# Patient Record
Sex: FEMALE
[De-identification: names, ages, dates, MRNs, and addresses within clinical notes are randomized per-mention and may not be internally consistent; named-entity substitution may affect disease eponyms.]

---

## 2023-10-22 ENCOUNTER — RX ONLY (OUTPATIENT)
Age: 48
Setting detail: RX ONLY
End: 2023-10-22

## 2023-11-28 ENCOUNTER — APPOINTMENT (RX ONLY)
Dept: URBAN - METROPOLITAN AREA CLINIC 41 | Facility: CLINIC | Age: 48
Setting detail: DERMATOLOGY
End: 2023-11-28

## 2023-11-28 DIAGNOSIS — L30.9 DERMATITIS, UNSPECIFIED: ICD-10-CM | Status: INADEQUATELY CONTROLLED

## 2023-11-28 PROCEDURE — 99204 OFFICE O/P NEW MOD 45 MIN: CPT

## 2023-11-28 PROCEDURE — ? TREATMENT REGIMEN

## 2023-11-28 PROCEDURE — ? COUNSELING

## 2023-11-28 PROCEDURE — ? PRESCRIPTION

## 2023-11-28 RX ORDER — CALCIPOTRIENE AND BETAMETHASONE DIPROPIONATE 50; .5 UG/G; MG/G
ONCE OINTMENT TOPICAL QD
Qty: 60 | Refills: 2 | Status: ERX | COMMUNITY
Start: 2023-11-28

## 2023-11-28 RX ADMIN — CALCIPOTRIENE AND BETAMETHASONE DIPROPIONATE ONCE: 50; .5 OINTMENT TOPICAL at 00:00

## 2023-11-28 ASSESSMENT — BSA RASH: BSA RASH: 1

## 2023-11-28 ASSESSMENT — SEVERITY ASSESSMENT: SEVERITY: MILD TO MODERATE

## 2023-11-28 ASSESSMENT — ITCH NUMERIC RATING SCALE: HOW SEVERE IS YOUR ITCHING?: 8

## 2023-11-28 NOTE — PROCEDURE: TREATMENT REGIMEN
Detail Level: Zone
Initiate Treatment: Generic taclonex ointment once a day at bedtime and aggressive moisturizing, we recommend eucerin. We will have her give it two weeks if not better she will contact the office.

## 2023-11-28 NOTE — PROCEDURE: COUNSELING
Patient Specific Counseling (Will Not Stick From Patient To Patient): She carries a Dx of psoriasis dating back to adolescence, but I noted that this is difficult to definitively confirm clinically. Some findings suggest the possibility of atopic dermatitis--the relatively early age of onset, the presenting locations (popliteal and antecubital), and the marked pruritus. The lesions are well within the spectrum of nummular eczema, although of course they would also fit for a somewhat eczematous presentation of psoriasis. Given BSA of under 1&, topical rx is indicated and as such, it's not so critical to make a definitive call: will treat with a topoical that \"covers both bases\" and see if she responds. \\n\\n******\\n\\nWe discussed that looking at her right now it is hard to make a definitive diagnosis. We discussed that this could be eczema or psoriasis. The problem is the treatment for each is a little different. We also discussed that when this initially flared if was in the fold areas which is more classic for eczema. Also the history of itching is  also classic for eczema. So the history is a little interesting and medications are specific to the diagnosis what works for eczema won’t work as well for psoriasis.
Detail Level: Detailed

## 2023-11-28 NOTE — HPI: RASH
What Type Of Note Output Would You Prefer (Optional)?: Standard Output
How Severe Is Your Rash?: moderate
Is This A New Presentation, Or A Follow-Up?: Rash
Additional History: She states she has a history of psoriasis and the clobetasol is calming it down but not clearing it at this time.

## 2024-10-21 ENCOUNTER — OFFICE VISIT (OUTPATIENT)
Dept: URBAN - METROPOLITAN AREA CLINIC 94 | Facility: CLINIC | Age: 49
End: 2024-10-21
Payer: COMMERCIAL

## 2024-10-21 ENCOUNTER — DASHBOARD ENCOUNTERS (OUTPATIENT)
Age: 49
End: 2024-10-21

## 2024-10-21 VITALS
WEIGHT: 186 LBS | BODY MASS INDEX: 29.89 KG/M2 | SYSTOLIC BLOOD PRESSURE: 161 MMHG | HEIGHT: 66 IN | DIASTOLIC BLOOD PRESSURE: 98 MMHG | HEART RATE: 71 BPM | OXYGEN SATURATION: 95 % | TEMPERATURE: 98.1 F

## 2024-10-21 DIAGNOSIS — R90.89 ABNORMAL BRAIN MRI: ICD-10-CM

## 2024-10-21 DIAGNOSIS — K76.89 LIVER DYSFUNCTION: ICD-10-CM

## 2024-10-21 PROBLEM — 77981007: Status: ACTIVE | Noted: 2024-10-21

## 2024-10-21 PROCEDURE — 99203 OFFICE O/P NEW LOW 30 MIN: CPT | Performed by: INTERNAL MEDICINE

## 2024-10-21 RX ORDER — ALPRAZOLAM 0.5 MG/1
1 TABLET TABLET ORAL TWICE A DAY
Status: ACTIVE | COMMUNITY

## 2024-10-21 RX ORDER — ERGOCALCIFEROL 1.25 MG/1
1 CAPSULE CAPSULE ORAL
Status: ACTIVE | COMMUNITY

## 2024-10-21 RX ORDER — NAPROXEN 500 MG/1
1 TABLET WITH FOOD OR MILK AS NEEDED TABLET ORAL
Status: ACTIVE | COMMUNITY

## 2024-10-21 RX ORDER — LEVOTHYROXINE SODIUM 50 UG/1
1 TABLET IN THE MORNING ON AN EMPTY STOMACH TABLET ORAL ONCE A DAY
Status: ACTIVE | COMMUNITY

## 2024-10-21 RX ORDER — TRAMADOL HYDROCHLORIDE 50 MG/1
1 TABLET AS NEEDED TABLET, FILM COATED ORAL ONCE A DAY
Status: ACTIVE | COMMUNITY

## 2024-10-21 RX ORDER — OMEPRAZOLE 40 MG/1
1 CAPSULE 30 MINUTES BEFORE MORNING MEAL CAPSULE, DELAYED RELEASE ORAL ONCE A DAY
Status: ACTIVE | COMMUNITY

## 2024-10-21 NOTE — HPI-TODAY'S VISIT:
50 y/o F hx of psoriasis, migriane presents for NP eval of possible hepatic dysfxn  Denies any GI symptoms at this time. Historically has hx of migraines for which she sees neuro (Dr. Virgen) for. Recently had MRI of brain which revealed abnl in the in the basal ganglia possibly related to hepatic dysfxn. No other issues at this time. No significant reported ETOH use, hx of IVDU, or hepatitis/hepatic dz.   12/2023 Labs: LFTs wnl, neg Hep C antibody.  10/2024 MRI brain w/o: There is subtle T1 hyperintensity within the right and left basal ganglia, somewhat symmetric. This can be seen with prior administration of parenterial nutrition. Differentials would include chronic hepatic dysfunction.

## 2024-10-22 LAB
ALBUMIN/GLOBULIN RATIO: 1.4
ALBUMIN: 4.1
ALKALINE PHOSPHATASE: 116
ALT (SGPT): 13
AST (SGOT): 18
BILIRUBIN, DIRECT: 0.1
BILIRUBIN, INDIRECT: 0.2
BILIRUBIN, TOTAL: 0.3
GLOBULIN: 3
PROTEIN, TOTAL: 7.1

## 2024-10-23 ENCOUNTER — TELEPHONE ENCOUNTER (OUTPATIENT)
Dept: URBAN - METROPOLITAN AREA CLINIC 6 | Facility: CLINIC | Age: 49
End: 2024-10-23

## 2024-10-23 ENCOUNTER — LAB OUTSIDE AN ENCOUNTER (OUTPATIENT)
Dept: URBAN - METROPOLITAN AREA CLINIC 6 | Facility: CLINIC | Age: 49
End: 2024-10-23

## 2025-05-06 ENCOUNTER — TELEPHONE ENCOUNTER (OUTPATIENT)
Dept: URBAN - METROPOLITAN AREA CLINIC 94 | Facility: CLINIC | Age: 50
End: 2025-05-06

## 2025-05-06 RX ORDER — METHYLPREDNISOLONE 4 MG/1
AS DIRECTED TABLET ORAL
Qty: 1 | Refills: 0 | OUTPATIENT
Start: 2025-05-06